# Patient Record
Sex: MALE | Race: WHITE | NOT HISPANIC OR LATINO | URBAN - METROPOLITAN AREA
[De-identification: names, ages, dates, MRNs, and addresses within clinical notes are randomized per-mention and may not be internally consistent; named-entity substitution may affect disease eponyms.]

---

## 2023-06-07 ENCOUNTER — APPOINTMENT (OUTPATIENT)
Dept: URBAN - METROPOLITAN AREA CLINIC 305 | Age: 23
Setting detail: DERMATOLOGY
End: 2023-06-07

## 2023-06-07 DIAGNOSIS — L90.5 SCAR CONDITIONS AND FIBROSIS OF SKIN: ICD-10-CM

## 2023-06-07 DIAGNOSIS — L73.2 HIDRADENITIS SUPPURATIVA: ICD-10-CM

## 2023-06-07 PROCEDURE — OTHER TREATMENT REGIMEN: OTHER

## 2023-06-07 PROCEDURE — 99204 OFFICE O/P NEW MOD 45 MIN: CPT

## 2023-06-07 PROCEDURE — OTHER PRESCRIPTION: OTHER

## 2023-06-07 PROCEDURE — OTHER MIPS QUALITY: OTHER

## 2023-06-07 PROCEDURE — OTHER COUNSELING: OTHER

## 2023-06-07 RX ORDER — DOXYCYCLINE HYCLATE 100 MG/1
CAPSULE, GELATIN COATED ORAL
Qty: 60 | Refills: 2 | Status: ERX | COMMUNITY
Start: 2023-06-07

## 2023-06-07 RX ORDER — CLINDAMYCIN PHOSPHATE 10 MG/ML
LOTION TOPICAL QD
Qty: 60 | Refills: 2 | Status: ERX | COMMUNITY
Start: 2023-06-07

## 2023-06-07 ASSESSMENT — LOCATION SIMPLE DESCRIPTION DERM
LOCATION SIMPLE: LEFT THIGH
LOCATION SIMPLE: RIGHT THIGH

## 2023-06-07 ASSESSMENT — LOCATION ZONE DERM: LOCATION ZONE: LEG

## 2023-06-07 ASSESSMENT — LOCATION DETAILED DESCRIPTION DERM
LOCATION DETAILED: RIGHT ANTERIOR PROXIMAL THIGH
LOCATION DETAILED: LEFT ANTERIOR PROXIMAL THIGH

## 2023-06-07 ASSESSMENT — HURLEY STAGE
IN YOUR EXPERIENCE, AMONG ALL PATIENTS YOU HAVE SEEN WITH THIS CONDITION, HOW SEVERE IS THIS PATIENT'S CONDITION?: HURLEY STAGE II: SINGLE OR MULTIPLE, WIDELY SEPARATED RECURRENT ABSCESSES WITH SINUS TRACT FORMATION AND SCARRING

## 2023-06-07 NOTE — PROCEDURE: TREATMENT REGIMEN
Detail Level: Zone
Plan: Advised this is not something we can cure but can manage. Will start doxycycline bid and clindamycin lotion. Recommended he get hibiclens otc to wash area with daily. Will follow up in 3 months, if not controlled will consider humira.
Otc Regimen: Hibiclens otc to wash with daily.
Initiate Treatment: Doxycycline bid, clindamycin lotion